# Patient Record
Sex: FEMALE | Race: BLACK OR AFRICAN AMERICAN
[De-identification: names, ages, dates, MRNs, and addresses within clinical notes are randomized per-mention and may not be internally consistent; named-entity substitution may affect disease eponyms.]

---

## 2020-08-12 ENCOUNTER — HOSPITAL ENCOUNTER (INPATIENT)
Dept: HOSPITAL 74 - FM/S | Age: 60
LOS: 2 days | Discharge: HOME | DRG: 301 | End: 2020-08-14
Payer: COMMERCIAL

## 2020-08-12 VITALS — BODY MASS INDEX: 33.4 KG/M2

## 2020-08-12 DIAGNOSIS — E78.5: ICD-10-CM

## 2020-08-12 DIAGNOSIS — G62.9: ICD-10-CM

## 2020-08-12 DIAGNOSIS — M25.551: ICD-10-CM

## 2020-08-12 DIAGNOSIS — E03.9: ICD-10-CM

## 2020-08-12 DIAGNOSIS — M16.11: Primary | ICD-10-CM

## 2020-08-12 PROCEDURE — 0SR90JZ REPLACEMENT OF RIGHT HIP JOINT WITH SYNTHETIC SUBSTITUTE, OPEN APPROACH: ICD-10-PCS

## 2020-08-12 RX ADMIN — GABAPENTIN SCH MG: 300 CAPSULE ORAL at 21:18

## 2020-08-12 RX ADMIN — GABAPENTIN SCH: 300 CAPSULE ORAL at 21:25

## 2020-08-12 RX ADMIN — CEFAZOLIN SODIUM SCH MLS/HR: 2 SOLUTION INTRAVENOUS at 17:34

## 2020-08-12 RX ADMIN — ATORVASTATIN CALCIUM SCH MG: 40 TABLET, FILM COATED ORAL at 21:18

## 2020-08-12 RX ADMIN — DOCUSATE SODIUM,SENNOSIDES SCH TABLET: 50; 8.6 TABLET, FILM COATED ORAL at 21:19

## 2020-08-12 NOTE — OP
DATE OF OPERATION:  08/12/2020

 

ATTENDING SURGEON:  James Perez MD.

 

FIRST ASSISTANT:  MADAY Law.

 

PREOPERATIVE DIAGNOSIS:  Severe osteoarthritis, right hip.  

 

POSTOPERATIVE DIAGNOSIS:  Severe osteoarthritis, right hip.  

 

PROCEDURE:  Right total hip arthroplasty with robotic assistance.  

 

ANESTHESIA:  Regional and spinal.

ESTIMATED BLOOD LOSS:  100.

 

IMPLANTS USED:  Richard Tritanium cluster cup size 48, 36-mm standard liner, 36-mm

plus 2.5-mm ceramic head, Accolade 2 femoral stem size 3. 

 

COMPLICATIONS:  None.

 

DISPOSITION:  Stable to recovery room following procedure.  

 

INDICATION:  This is 59-year-old woman with severe osteoarthritis of her right hip. 

She has had conservative treatment including physical therapy, medication injection,

and a significant pain and disability that affects her activities of daily living. 

She wishes to proceed with elective total hip arthroplasty at this point.  A thorough

discussion of risks and benefits was had with the patient regarding treatment

including infection, stiffness, dislocation, leg length discrepancy, fracture,

neurovascular injury.  She understands and wishes to proceed.  

 

DETAILS OF PROCEDURE:  Patient identified in preoperative area.  She had undergone

preoperative planning with CT scan for Makoplasty.  She underwent a regional block in

the preoperative area.  Was taken to the operating room and sat up on operating room

table and given a spinal anesthetic.  She received 2 g Ancef IV.  She also received 1

g tranexamic acid during incision and another during closure.  She was placed in the

lateral decubitus position and held in place with a pegboard positioned with bony

prominences well padded and the axillary roll in place.  Right hip and lower

extremity are prepped and draped in standard sterile fashion.  3 stab incisions were

made proximally for the pelvic array pins.  These pins were placed, and the pelvic

array was placed.  The approximately 13-cm curvilinear incision was made for the

posterior approach to the right hip.  The fascia was incised, external rotators and

capsules were divided off the posterior neck of the femur.  The femoral checkpoint

was placed and limb lengths were registered at that point.  The hip was dislocated

and the neck cut was made.  An anterior capsulotomy was performed through which

retractor was placed.  Soft tissues were excised around the periphery of the

acetabulum.  A Steinmann pin was placed superiorly for retraction and a superior

pelvic checkpoint was also placed at that point.  Registration was performed.  The

acetabulum was then reamed at the appropriate version and depth according to plan. 

The acetabulum was then impacted into place.  A final impaction and positioning

performed freehand.  The standard liner was placed and the locking mechanism engaged.

 The proximal femur then delivered at the incision, and the proximal femur was

prepared with box osteotome canal finder followed by serial broaches up to size 3,

which had excellent fit and fill and stability.  A trial head and neck were placed

standard, and the hip was reduced with good range of motion and no instability to

approximately 80 degrees of internal fixation with the hip flexed.  These trials were

removed, and the size 3 Accolade stem was placed and seated.  Again, neck lengths

were trialed.  The plus 2.5 allowed for slight better restoration of limb length and

stability.  The plus 2.5, 36-mm ceramic head was then placed and the Black taper

engaged.  The wound was copiously irrigated with pulsatile lavage many times.  The

short external rotators were repaired through drill hole in the trochanter.  The

quadratus femoris was repaired to the vastus lateralis tendon.  The perioperative

site was injected with a combination of _____ Toradol, and bupivacaine, and saline,

and also the surgical site was sprinkled with powdered vancomycin.  The fascia was

closed with number 1 Vicryl suture, the skin was closed with 2-0 Vicryl and staples. 

The proximal stab incisions were closed with nylon suture.  Sterile dressings were

applied.  The patient was then placed supine, and limb lengths were noted to be even.

 Abduction pillow was placed.  She was transferred to her bed, taken to recovery room

in stable condition.  Marcy Fernandes, was necessary, as she was integral to all parts

of the case, and no other physician was available to assist.  

 

 

JAMES PEREZ M.D.

 

[id number]

DD: 08/12/2020 10:38

DT: 08/12/2020 20:00

Job #:  [id number]

## 2020-08-12 NOTE — HP
CHIEF COMPLAINT: Right hip pain





HISTORY OF PRESENT ILLNESS:


59 year-old female with a PMH significant for HLD, hypothyroidism, peripheral 

neuropathy, breast cancer s/p partial mastectomy, and right hip OA s/p right 

total hip replacement GERMAIN today with Dr. Sears.





Recent Travel:


No





PAST MEDICAL HISTORY:


Hyperlipidemia


Hypothyroidism


Peripheral neuropathy


Breast cancer


Osteoarthritis


Depression





PAST SURGICAL HISTORY:


Partial mastectomy 2003


Umbilica hernia repair 1976


Tonsillectomy





Social History:  at [employer]


Smoking: former


Alcohol: no


Drugs: no





Family history: mother ovarian cancer, heart problems





Allergies


No Known Allergies Allergy (Verified 08/04/20 16:35)


   








HOME MEDICATIONS:


                                Home Medications











 Medication  Instructions  Recorded


 


Acetaminophen 1,000 mg PO DAILY PRN 08/04/20


 


Ascorbic Acid Vitamin C 1,000 mg PO DAILY 08/04/20


 


Atorvastatin Ca [Lipitor] 40 mg PO HS 08/04/20


 


Azelastine/Fluticasone 23 gm NS PRN PRN 08/04/20





[Azelastin-Flutic 137-50Mcg Spr]  


 


Cyanocobalamin [Vitamin B12 -] 1,000 mcg PO DAILY 08/04/20


 


Levothyroxine [Synthroid -] 100 mcg PO DAILY 08/04/20


 


Melatonin 600 mcg SL DAILY 08/04/20








REVIEW OF SYSTEMS


CONSTITUTIONAL: 


Absent:  fever, chills, diaphoresis, generalized weakness, malaise, loss of 

appetite, weight change


HEENT: 


Absent:  rhinorrhea, nasal congestion, throat pain, throat swelling, difficulty 

swallowing, mouth swelling, ear pain, eye pain, visual changes


CARDIOVASCULAR: 


Absent: chest pain, syncope, palpitations, irregular heart rate, lightheade

dness, peripheral edema


RESPIRATORY: 


Absent: cough, shortness of breath, dyspnea with exertion, orthopnea, wheezing, 

stridor, hemoptysis


GASTROINTESTINAL:


Absent: abdominal pain, abdominal distension, nausea, vomiting, diarrhea, 

constipation, melena, hematochezia


GENITOURINARY: 


Absent: dysuria, frequency, urgency, hesitancy, hematuria, flank pain, genital 

pain


MUSCULOSKELETAL: 


Absent: myalgia, arthralgia, joint swelling, back pain, neck pain


SKIN: 


Absent: rash, itching, pallor


HEMATOLOGIC/IMMUNOLOGIC: 


Absent: easy bleeding, easy bruising, lymphadenopathy, frequent infections


ENDOCRINE:


Absent: unexplained weight gain, unexplained weight loss, heat intolerance, cold

intolerance


NEUROLOGIC: 


Absent: headache, focal weakness or paresthesias, dizziness, unsteady gait, 

seizure, mental status changes, bladder or bowel incontinence


PSYCHIATRIC: 


Absent: anxiety, depression, suicidal or homicidal ideation, hallucinations.








PHYSICAL EXAMINATION


                               Vital Signs - 24 hr











  08/12/20 08/12/20 08/12/20





  06:43 10:30 10:35


 


Temperature 97.8 F 97.8 F 


 


Pulse Rate 73 83 81


 


Respiratory 15 16 14





Rate   


 


Blood Pressure 146/85 115/63 110/61


 


O2 Sat by Pulse 99 99 100





Oximetry (%)   














  08/12/20 08/12/20 08/12/20





  10:40 10:45 11:00


 


Temperature   


 


Pulse Rate 81 78 75


 


Respiratory 16 15 19





Rate   


 


Blood Pressure 112/61 112/60 116/61


 


O2 Sat by Pulse 100 100 100





Oximetry (%)   














  08/12/20 08/12/20 08/12/20





  11:15 11:30 11:45


 


Temperature   


 


Pulse Rate 76 76 83


 


Respiratory 17 18 14





Rate   


 


Blood Pressure 111/61 109/66 115/75


 


O2 Sat by Pulse 100 100 100





Oximetry (%)   














  08/12/20 08/12/20





  12:00 12:59


 


Temperature  97.7 F


 


Pulse Rate 81 88


 


Respiratory 16 16





Rate  


 


Blood Pressure 109/88 121/62


 


O2 Sat by Pulse 100 100





Oximetry (%)  











GENERAL: Awake, alert, and fully oriented, in no acute distress.


LUNGS: Breath sounds equal, clear to auscultation bilaterally. No wheezes, and 

no crackles. No accessory muscle use.


HEART: Regular rate and rhythm, S1 and S2 


ABDOMEN: Soft, nontender, not distended


UPPER EXTREMITIES: 2+ pulses, warm, well-perfused. No cyanosis. No clubbing. No 

peripheral edema.


LOWER EXTREMITIES: 


right hip surgical dressing c/d/i; no surrounding erythema or swelling


+flex/extend bilateral toes, sensory intact


abductor wedge in place


NEUROLOGICAL:  Cranial nerves II-XII intact. Normal speech. 





Pre op


BUN 9


Cr 0.8


Hgb 13.7





Intra op


cefazolin 2g x 1


EBL none


LR 1L





ASSESSMENT/PLAN:


59 year-old female with a PMH significant for HLD, hypothyroidism, peripheral 

neuropathy, breast cancer s/p partial mastectomy, and right hip OA s/p right 

total hip replacement GERMAIN today with Dr. Sears.





Right total hip replacement [facility]


         --POD #0


 --perioperative antibiotics per surgery


 --pain management per surgery


 --protonix 


 --bowel regimen


 --incentive spirometry





Hyperlipidemia


   --continue Lipitor





Hypothyroidism


   --continue levothyroxine





Peripheral neuropathy


   --continue Gabapentin, Vit B12





Breast cancer


   --stable





FEN


 Fluids: LR@125mL/hr


 Electrolytes: replete as indicated


 Nutrition: regular diet





DVT prophylaxis: subq lovenox 40mg daily; OOB, ambulation, SCDs, TEDs





Physical therapy





Dispo: continues to require inpatient care. Full code.








Visit type





- Emergency Visit


Emergency Visit: No





- New Patient


This patient is new to me today: Yes


Date on this admission: 08/12/20





- Critical Care


Critical Care patient: No

## 2020-08-13 LAB
ANION GAP SERPL CALC-SCNC: 9 MMOL/L (ref 8–16)
BUN SERPL-MCNC: 9 MG/DL (ref 7–18)
CALCIUM SERPL-MCNC: 9.4 MG/DL (ref 8.5–10)
CHLORIDE SERPL-SCNC: 106 MMOL/L (ref 98–107)
CO2 SERPL-SCNC: 24 MMOL/L (ref 21–32)
CREAT SERPL-MCNC: 0.7 MG/DL (ref 0.55–1.3)
DEPRECATED RDW RBC AUTO: 12.8 % (ref 11.6–15.6)
GLUCOSE SERPL-MCNC: 106 MG/DL (ref 74–106)
HCT VFR BLD CALC: 35.3 % (ref 32.4–45.2)
HGB BLD-MCNC: 11.4 GM/DL (ref 10.7–15.3)
MAGNESIUM SERPL-MCNC: 2 MG/DL (ref 1.8–2.4)
MCH RBC QN AUTO: 31.5 PG (ref 25.7–33.7)
MCHC RBC AUTO-ENTMCNC: 32.5 G/DL (ref 32–36)
MCV RBC: 97.1 FL (ref 80–96)
PLATELET # BLD AUTO: 217 K/MM3 (ref 134–434)
PMV BLD: 8.1 FL (ref 7.5–11.1)
POTASSIUM SERPLBLD-SCNC: 4.5 MMOL/L (ref 3.5–5.1)
RBC # BLD AUTO: 3.63 M/MM3 (ref 3.6–5.2)
SODIUM SERPL-SCNC: 139 MMOL/L (ref 136–145)
WBC # BLD AUTO: 10.3 K/MM3 (ref 4–10.8)

## 2020-08-13 RX ADMIN — MULTIVITAMIN TABLET SCH TAB: TABLET at 09:27

## 2020-08-13 RX ADMIN — PANTOPRAZOLE SODIUM SCH MG: 40 TABLET, DELAYED RELEASE ORAL at 09:27

## 2020-08-13 RX ADMIN — CYANOCOBALAMIN TAB 1000 MCG SCH MCG: 1000 TAB at 09:27

## 2020-08-13 RX ADMIN — ENOXAPARIN SODIUM SCH MG: 40 INJECTION SUBCUTANEOUS at 09:28

## 2020-08-13 RX ADMIN — GABAPENTIN SCH MG: 300 CAPSULE ORAL at 09:27

## 2020-08-13 RX ADMIN — DOCUSATE SODIUM,SENNOSIDES SCH TABLET: 50; 8.6 TABLET, FILM COATED ORAL at 21:35

## 2020-08-13 RX ADMIN — CEFAZOLIN SODIUM SCH MLS/HR: 2 SOLUTION INTRAVENOUS at 02:35

## 2020-08-13 RX ADMIN — LEVOTHYROXINE SODIUM SCH MCG: 100 TABLET ORAL at 06:20

## 2020-08-13 RX ADMIN — DOCUSATE SODIUM,SENNOSIDES SCH TABLET: 50; 8.6 TABLET, FILM COATED ORAL at 09:27

## 2020-08-13 RX ADMIN — GABAPENTIN SCH: 300 CAPSULE ORAL at 21:34

## 2020-08-13 RX ADMIN — ATORVASTATIN CALCIUM SCH MG: 40 TABLET, FILM COATED ORAL at 21:34

## 2020-08-13 RX ADMIN — GABAPENTIN SCH: 300 CAPSULE ORAL at 09:51

## 2020-08-13 NOTE — PN
Physical Exam: 


SUBJECTIVE: Patient seen and examined oob to chair. Pain is well-managed. Feels 

she did well with PT this morning.








OBJECTIVE:





                                   Vital Signs











 Period  Temp  Pulse  Resp  BP Sys/Gomez  Pulse Ox


 


 Last 24 Hr  97.4 F-98.5 F    14-19  109-152/58-88  











GENERAL: Awake, alert, and fully oriented, in no acute distress.


LUNGS: Breath sounds equal, clear to auscultation bilaterally. No wheezes, and 

no crackles. No accessory muscle use.


HEART: Regular rate and rhythm, S1 and S2 


ABDOMEN: Soft, nontender, not distended


UPPER EXTREMITIES: 2+ pulses, warm, well-perfused. No cyanosis. No clubbing. No 

peripheral edema.


LOWER EXTREMITIES: 


right hip surgical dressing c/d/i; no surrounding erythema or swelling


+flex/extend bilateral toes, sensory intact


NEUROLOGICAL:  Cranial nerves II-XII intact. Normal speech. 

















Active Medications











Generic Name Dose Route Start Last Admin





  Trade Name Freq  PRN Reason Stop Dose Admin


 


Acetaminophen  1,000 mg  08/12/20 10:49 





  Tylenol -  PO  





  Q6H PRN  





   PAIN OR FEVER  


 


Al Hydroxide/Mg Hydroxide  30 ml  08/12/20 10:49 





  Mylanta Oral Suspension -  PO  





  Q4H PRN  





  DYSPEPSIA  


 


Atorvastatin Calcium  40 mg  08/12/20 22:00  08/12/20 21:18





  Lipitor -  PO   40 mg





  HS EMMY   Administration


 


Cyanocobalamin  1,000 mcg  08/13/20 10:00 





  Vitamin B12 -  PO  





  DAILY On license of [facility]  


 


Enoxaparin Sodium  40 mg  08/13/20 08:00 





  Lovenox -  SQ  





  DAILY@0800 EMMY  


 


Gabapentin  300 mg  08/12/20 22:00  08/12/20 21:25





  Neurontin -  PO  08/15/20 21:59  Not Given





  BID EMMY  


 


Levothyroxine Sodium  100 mcg  08/13/20 07:00  08/13/20 06:20





  Synthroid -  PO   100 mcg





  DAILY@0700 EMMY   Administration


 


Magnesium Hydroxide  30 ml  08/12/20 10:49 





  Milk Of Magnesia -  PO  





  PRN PRN  





  CONSTIPATION  


 


Multivitamins/Minerals/Vitamin C  1 tab  08/13/20 10:00 





  Tab-A-Vit -  PO  





  DAILY EMMY  


 


Ondansetron HCl  4 mg  08/12/20 10:49 





  Zofran Injection  IVPUSH  





  Q6H PRN  





  NAUSEA  


 


Oxycodone HCl  5 mg  08/12/20 10:49 





  Roxicodone -  PO  





  Q4H PRN  





  PAIN LEVEL 1-3  


 


Oxycodone HCl  10 mg  08/12/20 10:49  08/13/20 00:39





  Roxicodone -  PO   10 mg





  Q4H PRN   Administration





  PAIN LEVEL 4-6  


 


Pantoprazole Sodium  40 mg  08/13/20 10:00 





  Protonix -  PO  





  DAILY EMMY  


 


Senna/Docusate Sodium  2 tablet  08/12/20 22:00  08/12/20 21:19





  Pericolace -  PO   2 tablet





  BID EMMY   Administration














Pre op


BUN 9


Cr 0.8


Hgb 13.7





Intra op


cefazolin 2g x 1


EBL none


LR 1L





ASSESSMENT/PLAN:


59 year-old female with a PMH significant for HLD, hypothyroidism, peripheral 

neuropathy, breast cancer s/p partial mastectomy, and right hip OA s/p right 

total hip replacement GERMAIN today with Dr. Sears.





Right total hip replacement [facility]


         --POD #1


 --perioperative antibiotics complete


 --pain well-managed with PO meds


 --protonix 


 --bowel regimen


 --incentive spirometry





Hyperlipidemia


   --continue Lipitor





Hypothyroidism


   --continue levothyroxine





Peripheral neuropathy


   --continue Gabapentin, Vit B12





Breast cancer


   --stable





FEN


 Fluids: PO intake adequate


 Electrolytes: replete as indicated


 Nutrition: regular diet





DVT prophylaxis: subq lovenox 40mg daily; OOB, ambulation, SCDs, TEDs





Physical therapy





Dispo: continues to require inpatient care. Full code.








Visit type





- Emergency Visit


Emergency Visit: No





- New Patient


This patient is new to me today: No





- Critical Care


Critical Care patient: No

## 2020-08-13 NOTE — PN
Progress Note, Physician


Chief Complaint: 





pain controlled





- Current Medication List


Current Medications: 


Active Medications





Acetaminophen (Tylenol -)  1,000 mg PO Q6H PRN


   PRN Reason:  PAIN OR FEVER


Al Hydroxide/Mg Hydroxide (Mylanta Oral Suspension -)  30 ml PO Q4H PRN


   PRN Reason: DYSPEPSIA


Atorvastatin Calcium (Lipitor -)  40 mg PO HS [facility]


   Last Admin: 08/12/20 21:18 Dose:  40 mg


   Documented by: 


Cyanocobalamin (Vitamin B12 -)  1,000 mcg PO DAILY [facility]


Enoxaparin Sodium (Lovenox -)  40 mg SQ DAILY@0800 [facility]


Gabapentin (Neurontin -)  300 mg PO BID [facility]


   Stop: 08/15/20 21:59


   Last Admin: 08/12/20 21:25 Dose:  Not Given


   Documented by: 


Levothyroxine Sodium (Synthroid -)  100 mcg PO DAILY@0700 [facility]


   Last Admin: 08/13/20 06:20 Dose:  100 mcg


   Documented by: 


Magnesium Hydroxide (Milk Of Magnesia -)  30 ml PO PRN PRN


   PRN Reason: CONSTIPATION


Multivitamins/Minerals/Vitamin C (Tab-A-Vit -)  1 tab PO DAILY [facility]


Ondansetron HCl (Zofran Injection)  4 mg IVPUSH Q6H PRN


   PRN Reason: NAUSEA


Oxycodone HCl (Roxicodone -)  5 mg PO Q4H PRN


   PRN Reason: PAIN LEVEL 1-3


Oxycodone HCl (Roxicodone -)  10 mg PO Q4H PRN


   PRN Reason: PAIN LEVEL 4-6


   Last Admin: 08/13/20 00:39 Dose:  10 mg


   Documented by: 


Pantoprazole Sodium (Protonix -)  40 mg PO DAILY [facility]


Senna/Docusate Sodium (Pericolace -)  2 tablet PO BID [facility]


   Last Admin: 08/12/20 21:19 Dose:  2 tablet


   Documented by: 











- Objective


Vital Signs: 


                                   Vital Signs











Temperature  98.5 F   08/13/20 06:00


 


Pulse Rate  99 H  08/13/20 06:00


 


Respiratory Rate  18   08/13/20 06:00


 


Blood Pressure  152/75   08/13/20 06:00


 


O2 Sat by Pulse Oximetry (%)  96   08/13/20 06:00











Constitutional: Yes: No Distress


Wound/Incision: Yes: Clean/Dry


Neurological: Yes: WNL





Problem List





- Problems


(1) Right hip pain


Assessment/Plan: 


POD 1 R JOSE


PT/OT


hip precautions


Code(s): M25.551 - PAIN IN RIGHT HIP   





Assessment/Plan





POD 1 R JOSE


PT/OT


hip precautions


                                        POD 1 R JOSE


PT/OT


hip precautions


DVT prophylaxis

## 2020-08-13 NOTE — PN
Our recommendation would be to contact her PCP and request a different antibiotic which would not interact with her Flecainide. If she wished to remain on the antibiotic. She could hold the Flecainide for the duration of her antibiotic treatment. But we would not recommend. Using ciprofloxacin together with flecainide can increase the risk of an irregular heart rhythm that may be serious and potentially life-threatening. Please contact patient to advise.     Thanks,  Shyann Prather RN Progress Note (short form)





- Note


Progress Note: 





59F POD1 s/p R THR under spinal anesthetic with peripheral nerve blocks.


Pt states that pain is well controlled and reports no anesthetic complications.


AVSS.


Continue current regimen.

## 2020-08-14 VITALS — DIASTOLIC BLOOD PRESSURE: 62 MMHG | TEMPERATURE: 98.7 F | SYSTOLIC BLOOD PRESSURE: 124 MMHG | HEART RATE: 90 BPM

## 2020-08-14 LAB
ALBUMIN SERPL-MCNC: 3.5 G/DL (ref 3.4–5)
ALP SERPL-CCNC: 85 U/L (ref 45–117)
ALT SERPL-CCNC: 17 U/L (ref 13–61)
ANION GAP SERPL CALC-SCNC: 10 MMOL/L (ref 8–16)
AST SERPL-CCNC: 29 U/L (ref 15–37)
BILIRUB SERPL-MCNC: 0.6 MG/DL (ref 0.2–1)
BUN SERPL-MCNC: 9 MG/DL (ref 7–18)
CALCIUM SERPL-MCNC: 8.9 MG/DL (ref 8.5–10)
CHLORIDE SERPL-SCNC: 101 MMOL/L (ref 98–107)
CO2 SERPL-SCNC: 23 MMOL/L (ref 21–32)
CREAT SERPL-MCNC: 0.7 MG/DL (ref 0.55–1.3)
DEPRECATED RDW RBC AUTO: 13.1 % (ref 11.6–15.6)
GLUCOSE SERPL-MCNC: 116 MG/DL (ref 74–106)
HCT VFR BLD CALC: 35.7 % (ref 32.4–45.2)
HGB BLD-MCNC: 11.5 GM/DL (ref 10.7–15.3)
MAGNESIUM SERPL-MCNC: 1.8 MG/DL (ref 1.8–2.4)
MCH RBC QN AUTO: 31 PG (ref 25.7–33.7)
MCHC RBC AUTO-ENTMCNC: 32.1 G/DL (ref 32–36)
MCV RBC: 96.7 FL (ref 80–96)
PLATELET # BLD AUTO: 254 K/MM3 (ref 134–434)
PMV BLD: 8.3 FL (ref 7.5–11.1)
POTASSIUM SERPLBLD-SCNC: 3.7 MMOL/L (ref 3.5–5.1)
PROT SERPL-MCNC: 6.8 G/DL (ref 6.4–8.2)
RBC # BLD AUTO: 3.7 M/MM3 (ref 3.6–5.2)
SODIUM SERPL-SCNC: 134 MMOL/L (ref 136–145)
WBC # BLD AUTO: 10.8 K/MM3 (ref 4–10.8)

## 2020-08-14 RX ADMIN — DOCUSATE SODIUM,SENNOSIDES SCH TABLET: 50; 8.6 TABLET, FILM COATED ORAL at 09:05

## 2020-08-14 RX ADMIN — LEVOTHYROXINE SODIUM SCH MCG: 100 TABLET ORAL at 06:18

## 2020-08-14 RX ADMIN — MULTIVITAMIN TABLET SCH TAB: TABLET at 09:05

## 2020-08-14 RX ADMIN — CYANOCOBALAMIN TAB 1000 MCG SCH MCG: 1000 TAB at 09:05

## 2020-08-14 RX ADMIN — GABAPENTIN SCH: 300 CAPSULE ORAL at 09:05

## 2020-08-14 RX ADMIN — PANTOPRAZOLE SODIUM SCH MG: 40 TABLET, DELAYED RELEASE ORAL at 09:05

## 2020-08-14 RX ADMIN — ENOXAPARIN SODIUM SCH MG: 40 INJECTION SUBCUTANEOUS at 09:04

## 2020-08-14 NOTE — DS
Physical Exam: 


SUBJECTIVE: Patient seen and examined








OBJECTIVE:





                                   Vital Signs











 Period  Temp  Pulse  Resp  BP Sys/Gomez  Pulse Ox


 


 Last 24 Hr  98.4 F-100.2 F    16-20  124-162/55-83  93-96








PHYSICAL EXAM





GENERAL: Awake, alert, and fully oriented, in no acute distress.


LUNGS: Breath sounds equal, clear to auscultation bilaterally. No wheezes, and 

no crackles. No accessory muscle use.


HEART: Regular rate and rhythm, S1 and S2 


ABDOMEN: Soft, nontender, not distended


UPPER EXTREMITIES: 2+ pulses, warm, well-perfused. No cyanosis. No clubbing. No 

peripheral edema.


LOWER EXTREMITIES: 


right hip surgical dressing c/d/i; no surrounding erythema or swelling


+flex/extend bilateral toes, sensory intact


NEUROLOGICAL:  Cranial nerves II-XII intact. Normal speech. 








LABS


                         Laboratory Results - last 24 hr











  08/14/20 08/14/20





  07:03 07:03


 


WBC  10.8 


 


RBC  3.70 


 


Hgb  11.5 


 


Hct  35.7 


 


MCV  96.7 H 


 


MCH  31.0 


 


MCHC  32.1 


 


RDW  13.1 


 


Plt Count  254 


 


MPV  8.3 


 


Sodium   134 L


 


Potassium   3.7


 


Chloride   101


 


Carbon Dioxide   23


 


Anion Gap   10


 


BUN   9.0


 


Creatinine   0.7


 


Est GFR (CKD-EPI)AfAm   109.15


 


Est GFR (CKD-EPI)NonAf   94.17


 


Random Glucose   116 H


 


Calcium   8.9


 


Magnesium   1.8


 


Total Bilirubin   0.6


 


AST   29


 


ALT   17


 


Alkaline Phosphatase   85


 


Total Protein   6.8


 


Albumin   3.5











HOSPITAL COURSE:





Date of Admission:08/12/20





Date of Discharge: 08/14/20





59 year-old female with a PMH significant for HLD, hypothyroidism, peripheral 

neuropathy, breast cancer s/p partial mastectomy, and right hip OA s/p right 

total hip replacement GERMAIN today with Dr. Sears.





Right total hip replacement [facility]


         --POD #1


 --perioperative antibiotics complete


 --pain well-managed with PO meds


 --discharge on ASA 81mg daily x 4 weeks 





Hyperlipidemia


   --continued Lipitor





Hypothyroidism


   --continued levothyroxine





Peripheral neuropathy


   --continued Gabapentin, Vit B12





Breast cancer


   --stable





************************





I STOP checked, no record found


Minutes to complete discharge: 35





Discharge Summary


Problems reviewed: Yes


Reason For Visit: RIGHT HIP OSTEOARTHRITIS


Current Active Problems





Right hip pain (Acute)








Condition: Improved





- Instructions


Diet, Activity, Other Instructions: 


You are to take aspirin 81mg every day for 4 weeks.





You need to follow up with your orthopedic surgeon, Dr. Sears, in two weeks.


Referrals: 


James Sears MD [Staff Physician] - 


Disposition: HOME





- Home Medications


Comprehensive Discharge Medication List: 


Ambulatory Orders





Acetaminophen 1,000 mg PO DAILY PRN 08/04/20 


Ascorbic Acid [Vitamin C] 1,000 mg PO DAILY 08/04/20 


Atorvastatin Ca [Lipitor] 40 mg PO HS 08/04/20 


Azelastine/Fluticasone [Azelastin-Flutic 137-50Mcg Spr] 23 gm NS PRN PRN 

08/04/20 


Cyanocobalamin [Vitamin B12 -] 1,000 mcg PO DAILY 08/04/20 


Levothyroxine [Synthroid -] 100 mcg PO DAILY 08/04/20 


Melatonin 600 mcg SL DAILY 08/04/20 


oxyCODONE HCL [Roxicodone -] 5 mg PO Q6H PRN #20 tablet MDD 4 08/14/20 








Prescription Drug Monitoring Program (I-STOP) results: I-STOP reviewed and no 

issues identified


This patient is new to me today: No


Emergency Visit: No


Critical Care patient: No





- Discharge Referral


Referred to [facility] No

## 2020-08-14 NOTE — PN
Progress Note (short form)





- Note


Progress Note: 





Pt c/o pain, still holly numbness anterior thigh.


RLE DNVI, dressings CDI


POD2 R JOSE


PT/OT, WBAT, hip precautions


DVT prophylaxis, ASA x 4 weeks when D/C home


f/u in office 2weeks





Problem List





- Problems


(1) Right hip pain


Code(s): M25.551 - PAIN IN RIGHT HIP

## 2020-08-17 NOTE — PATH
Surgical Pathology Report



Patient Name:  STORM ALTMAN

Accession #:  

Med. Rec. #:  [id number]                                                        

   /Age/Gender:  1960 (Age: 59) / F

Account:  [id number]                                                          

             Location: [facility]

Taken:  2020

Received:  2020

Reported:  2020

Physicians:  James Sears M.D.

  



Specimen(s) Received

 RIGHT HIP FEMORAL HEAD 





Clinical History

Right hip osteoarthritis







Final Diagnosis

FEMORAL HEAD, RIGHT, HIP, TOTAL HIP REPLACEMENT:

DEGENERATIVE JOINT DISEASE.





***Electronically Signed***

Hayley Swanson M.D.





Gross Description

Received in formalin, labeled "right hip femoral head," is a 4.0 x 3.8 x 3.8 cm.

femoral head with a 1.3 cm in length portion of femoral neck attached. The

margin of resection is smooth. There is a 1.8 cm in greatest dimension area of

eburnation present. The remaining articular surface is tan-yellow and focally

granular. The underlying trabecular bone is yellow and hard. A representative

section is submitted in one cassette, following decalcification.  





[facility]